# Patient Record
Sex: FEMALE | Race: WHITE
[De-identification: names, ages, dates, MRNs, and addresses within clinical notes are randomized per-mention and may not be internally consistent; named-entity substitution may affect disease eponyms.]

---

## 2017-01-11 ENCOUNTER — HOSPITAL ENCOUNTER (EMERGENCY)
Dept: HOSPITAL 25 - ED | Age: 22
Discharge: HOME | End: 2017-01-11
Payer: COMMERCIAL

## 2017-01-11 VITALS — SYSTOLIC BLOOD PRESSURE: 120 MMHG | DIASTOLIC BLOOD PRESSURE: 63 MMHG

## 2017-01-11 DIAGNOSIS — K08.89: Primary | ICD-10-CM

## 2017-01-11 PROCEDURE — 99282 EMERGENCY DEPT VISIT SF MDM: CPT

## 2017-01-11 NOTE — ED
Throat Pain/Nasal Congestion





- HPI Summary


HPI Summary: 


21 F w/ no PMH presents with dental pain today.  She had previous dental pain 

in that area when she bite into something today and felt her tooth crack.  She 

states that she also felt some pus leave the area.  She called her dentist who 

stated to come to the ER for antibiotics and he could see her next Thursday.  

She denies any SOB, fever, chest pain, and difficulty swallowing.





- History of Current Complaint


Chief Complaint: EDDentalPain


Time Seen by Provider: 01/11/17 22:17





- Allergies/Home Medications


Allergies/Adverse Reactions: 


 Allergies











Allergy/AdvReac Type Severity Reaction Status Date / Time


 


Bee Venom Allergy  Difficulty Verified 06/01/16 11:13





   Breathing  














PMH/Surg Hx/FS Hx/Imm Hx


Respiratory History: Reports: Hx Asthma


 History: Reports: Hx Kidney Infection


Psychiatric History: Reports: Hx Anxiety, Hx Depression


Infectious Disease History: No


Infectious Disease History: 


   Denies: Traveled Outside the [place] in Last 30 Days





- Family History


Known Family History: Positive: Hypertension





- Social History


Alcohol Use: None


Alcohol Amount: denies


Substance Use Type: Reports: None


Substance Use Comment - Amount & Last Used: denies


Smoking Status (MU): Current Every Day Smoker


Type: Cigarettes


Amount Used/How Often: 1/2ppd


Have You Smoked in the Last Year: Yes





Review of Systems


Negative: Fever


Positive: Dental Pain


Negative: Chest Pain


Negative: Shortness Of Breath


All Other Systems Reviewed And Are Negative: Yes





Physical Exam


Triage Information Reviewed: Yes


Vital Signs On Initial Exam: 


 Initial Vitals











Temp Pulse Resp BP Pulse Ox


 


 97.2 F   85   18   120/63   100 


 


 01/11/17 21:50  01/11/17 21:50  01/11/17 21:50  01/11/17 21:50  01/11/17 21:50











Vital Signs Reviewed: Yes


Appearance: Positive: Well-Appearing


Skin: Positive: Warm, Dry


Head/Face: Positive: Normal Head/Face Inspection


Eyes: Positive: Normal, EOMI, MEAGHAN, Conjunctiva Clear


ENT: Positive: Normal ENT inspection, Pharynx normal, TMs normal


Dental: Positive: Percussion Tenderness @ - 30, Gross Decay/Caries @ - 30.  

Negative: Dental Fracture @, Abscess @


Neck: Positive: Supple, Nontender, No Lymphadenopathy


Respiratory/Lung Sounds: Positive: Clear to Auscultation, Breath Sounds Present


Cardiovascular: Positive: Normal, RRR





Diagnostics





- Vital Signs


 Vital Signs











  Temp Pulse Resp BP Pulse Ox


 


 01/11/17 21:50  97.2 F  85  18  120/63  100














- Laboratory


Lab Statement: Any lab studies that have been ordered have been reviewed, and 

results considered in the medical decision making process.





EENT Course/Dx





- Course


Course Of Treatment: 21 F presents with dental pain, has taken many pain 

medication without relief, said that was drainage from potential cracked tooth 

early today s/p bite into something, on exam tenderness to 30 no fracture or 

abscess noted, will treat with antibiotics and pain medication, patient agrees 

with plan,





- Differential Diagnoses


Differential Diagnoses: Dental Abscess, Dental Caries, Fractured Tooth





- Diagnoses


Provider Diagnoses: 


 Pain, dental








Discharge





- Discharge Plan


Condition: Good


Disposition: HOME


Prescriptions: 


Penicillin VK TAB* [Penicillin Vk Tab*] 500 mg PO QID #27 tab


oxyCODONE/Acetamin 5/325 MG* [Percocet 5/325 TAB*] 1 tab PO Q6H PRN #12 tab MDD 

4


 PRN Reason: Pain


Patient Education Materials:  Toothache (ED), Penicillin V (By mouth), Oxycodone

/Acetaminophen (By mouth)


Referrals: 


[facility] [place] PHYSICIAN REFERRAL [Outside]


Additional Instructions: 


Take antibiotics: 4 times a day for 7 days, first dose given ED


Use ibuprofen every 6 hours and narcotic for break through pain


Avoid hard, crunchy food until seen by dentist


Follow up with dentist as scheduled


Return to ED if develop fever, shortness of breath, pain with eye movement or 

swelling around eye


Establish care with primary care physician

## 2017-01-17 ENCOUNTER — HOSPITAL ENCOUNTER (EMERGENCY)
Dept: HOSPITAL 25 - ED | Age: 22
LOS: 1 days | Discharge: HOME | End: 2017-01-18
Payer: COMMERCIAL

## 2017-01-17 VITALS — DIASTOLIC BLOOD PRESSURE: 90 MMHG | SYSTOLIC BLOOD PRESSURE: 117 MMHG

## 2017-01-17 DIAGNOSIS — K08.89: ICD-10-CM

## 2017-01-17 DIAGNOSIS — K02.9: Primary | ICD-10-CM

## 2017-01-17 DIAGNOSIS — K03.81: ICD-10-CM

## 2017-01-17 DIAGNOSIS — F17.210: ICD-10-CM

## 2017-01-17 PROCEDURE — 99282 EMERGENCY DEPT VISIT SF MDM: CPT

## 2017-01-18 NOTE — ED
Throat Pain/Nasal Congestion





- HPI Summary


HPI Summary: 


21 F presents with dental pain for 1 day.  She states she feel at work and 

broke her left upper tooth.  The tooth already had a cavity to it. She was 

already taking PCN for dental pain in another tooth pain since last week by me.

  She has a dental appointment scheduled for this Thursday.  She denies any SOB

, chest pain, swelling around the eyes, or difficulty swallowing.








- History of Current Complaint


Chief Complaint: EDDentalPain


Time Seen by Provider: 01/17/17 23:12





- Allergies/Home Medications


Allergies/Adverse Reactions: 


 Allergies











Allergy/AdvReac Type Severity Reaction Status Date / Time


 


Bee Venom Allergy  Difficulty Verified 06/01/16 11:13





   Breathing  














PMH/Surg Hx/FS Hx/Imm Hx


Endocrine/Hematology History: 


   Denies: Hx Anticoagulant Therapy


Respiratory History: Reports: Hx Asthma


 History: Reports: Hx Kidney Infection


Psychiatric History: Reports: Hx Anxiety, Hx Depression


Infectious Disease History: No


Infectious Disease History: 


   Denies: Traveled Outside the [place] in Last 30 Days





- Family History


Known Family History: Positive: Hypertension





- Social History


Alcohol Use: None


Alcohol Amount: denies


Substance Use Type: Reports: None


Substance Use Comment - Amount & Last Used: denies


Smoking Status (MU): Current Every Day Smoker


Type: Cigarettes


Amount Used/How Often: 1/2ppd


Have You Smoked in the Last Year: Yes





Review of Systems


Negative: Fever


Positive: Dental Pain


Negative: Chest Pain


Negative: Shortness Of Breath


All Other Systems Reviewed And Are Negative: Yes





Physical Exam


Triage Information Reviewed: Yes


Vital Signs On Initial Exam: 


 Initial Vitals











Temp Pulse Resp BP Pulse Ox


 


 97.7 F   94   18   117/90   100 


 


 01/17/17 22:51  01/17/17 22:51  01/17/17 22:51  01/17/17 22:51  01/17/17 22:51











Vital Signs Reviewed: Yes


Appearance: Positive: Well-Appearing


Skin: Positive: Warm, Dry


Head/Face: Positive: Normal Head/Face Inspection


Eyes: Positive: Normal, EOMI, MEAGHAN, Conjunctiva Clear


ENT: Positive: Normal ENT inspection, Pharynx normal, TMs normal


Dental: Positive: Gross Decay/Caries @ - 11, Dental Fracture @ - 11.  Negative: 

Abscess @, Bleeding


Neck: Positive: Supple, Nontender, No Lymphadenopathy


Respiratory/Lung Sounds: Positive: Clear to Auscultation, Breath Sounds Present


Cardiovascular: Positive: Normal, RRR





Procedures





- Procedure Summary


Procedure Summary: 


clean area, applied topical anaesthesia spray, applied dental paste to tooth 

number 11








Diagnostics





- Vital Signs


 Vital Signs











  Temp Pulse Resp BP Pulse Ox


 


 01/17/17 22:51  97.7 F  94  18  117/90  100














- Laboratory


Lab Statement: Any lab studies that have been ordered have been reviewed, and 

results considered in the medical decision making process.





EENT Course/Dx





- Course


Course Of Treatment: 21 F presents with dental pain s/p fracture tooth on 

counter, has been taking PCN for previous dental pain but states that upset 

stomach, took ibupforen for pain, has dental appt on this thursday, due to 

nerve being exposed placed dental paste on area after anasethsia, tolerated 

procedure, will prescribe amoxillicin for antibiotics and pain medication and 

have follow up with dentist, patient agrees with plan





- Differential Diagnoses


Differential Diagnoses: Dental Abscess, Dental Caries, Fractured Tooth





- Diagnoses


Provider Diagnoses: 


 Dental caries, Tooth fracture








Discharge





- Discharge Plan


Condition: Good


Disposition: HOME


Prescriptions: 


Amoxicillin CAP* 500 mg PO Q12H #13 cap


oxyCODONE/Acetamin 5/325 MG* [Percocet 5/325 TAB*] 1 tab PO Q6H PRN #8 tab MDD 4


 PRN Reason: Pain


Patient Education Materials:  Toothache (ED)


Referrals: 


[facility] [place] PHYSICIAN REFERRAL [Outside]


No Primary Care Phys,NOPCP [Primary Care Provider] - 


Additional Instructions: 


Take antibiotics: twice a day for 7 days, first dose given in ED


Use ibuprofen every 6 hours and narcotic for break through pain at night


Avoid hard, crunchy food until seen by dentist


Follow up with dentist as scheduled


Return to ED if develop fever, shortness of breath, pain with eye movement or 

swelling around eye


Establish care with primary care physician

## 2017-02-07 ENCOUNTER — HOSPITAL ENCOUNTER (EMERGENCY)
Dept: HOSPITAL 25 - ED | Age: 22
Discharge: HOME | End: 2017-02-07
Payer: COMMERCIAL

## 2017-02-07 VITALS — SYSTOLIC BLOOD PRESSURE: 148 MMHG | DIASTOLIC BLOOD PRESSURE: 74 MMHG

## 2017-02-07 DIAGNOSIS — F17.210: ICD-10-CM

## 2017-02-07 DIAGNOSIS — K08.89: Primary | ICD-10-CM

## 2017-02-07 PROCEDURE — 99282 EMERGENCY DEPT VISIT SF MDM: CPT
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
## 2017-04-24 ENCOUNTER — HOSPITAL ENCOUNTER (EMERGENCY)
Dept: HOSPITAL 25 - ED | Age: 22
Discharge: HOME | End: 2017-04-24
Payer: COMMERCIAL

## 2017-04-24 VITALS — DIASTOLIC BLOOD PRESSURE: 84 MMHG | SYSTOLIC BLOOD PRESSURE: 115 MMHG

## 2017-04-24 DIAGNOSIS — K08.89: Primary | ICD-10-CM

## 2017-04-24 DIAGNOSIS — F17.210: ICD-10-CM

## 2017-04-24 DIAGNOSIS — R06.02: ICD-10-CM

## 2017-04-24 PROCEDURE — 99281 EMR DPT VST MAYX REQ PHY/QHP: CPT

## 2017-04-24 NOTE — ED
Throat Pain/Nasal Congestion





- HPI Summary


HPI Summary: 


21F presents with dental pain for three days. pain is located on right lower 

tooth. Has dental appointment next Thursday.  Denies any fever, chest pain, SOB

, or difficulty swallowing. She took some percocet for previous dental 

infection which seemed to help. She has also been taking ibuprofen 








- History of Current Complaint


Chief Complaint: EDDentalPain


Time Seen by Provider: 04/24/17 13:00





- Allergies/Home Medications


Allergies/Adverse Reactions: 


 Allergies











Allergy/AdvReac Type Severity Reaction Status Date / Time


 


Bee Venom Allergy  Difficulty Verified 06/01/16 11:13





   Breathing  














PMH/Surg Hx/FS Hx/Imm Hx


Endocrine/Hematology History: 


   Denies: Hx Anticoagulant Therapy


Respiratory History: Reports: Hx Asthma


 History: Reports: Hx Kidney Infection


Psychiatric History: Reports: Hx Anxiety, Hx Depression


Infectious Disease History: No


Infectious Disease History: 


   Denies: Traveled Outside the [place] in Last 30 Days





- Family History


Known Family History: Positive: Hypertension





- Social History


Alcohol Use: None


Alcohol Amount: denies


Substance Use Type: Reports: None


Substance Use Comment - Amount & Last Used: denies


Smoking Status (MU): Current Every Day Smoker


Type: Cigarettes


Amount Used/How Often: 1/2ppd


Have You Smoked in the Last Year: Yes





Review of Systems


Negative: Fever


Positive: Dental Pain


Negative: Chest Pain


Positive: Shortness Of Breath


All Other Systems Reviewed And Are Negative: Yes





Physical Exam


Triage Information Reviewed: Yes


Vital Signs On Initial Exam: 


 Initial Vitals











Temp Pulse Resp BP Pulse Ox


 


 97.3 F   93   18   115/64   90 


 


 04/24/17 12:27  04/24/17 12:27  04/24/17 12:27  04/24/17 12:27  04/24/17 12:27











Vital Signs Reviewed: Yes


Appearance: Positive: Well-Appearing


Skin: Positive: Warm, Dry


Head/Face: Positive: Normal Head/Face Inspection


Eyes: Positive: Normal, Conjunctiva Clear


ENT: Positive: Normal ENT inspection, Pharynx normal, TMs normal


Dental: Positive: Percussion Tenderness @ - 30, Gross Decay/Caries @ - 30.  

Negative: Abscess @, Bleeding


Respiratory/Lung Sounds: Positive: Clear to Auscultation, Breath Sounds Present


Cardiovascular: Positive: Normal, RRR





Diagnostics





- Vital Signs


 Vital Signs











  Temp Pulse Resp BP Pulse Ox


 


 04/24/17 12:39  97.3 F  93  18  115/84  97


 


 04/24/17 12:27  97.3 F  93  18  115/64  90














- Laboratory


Lab Statement: Any lab studies that have been ordered have been reviewed, and 

results considered in the medical decision making process.





EENT Course/Dx





- Course


Course Of Treatment: 21F presents with dental pain for 3 days. pain located at 

tooth 30. denies any fever, chest pain or SOB. will treat with amoxicllin and 

pain medication. patient understands and agrees with plan





- Differential Diagnoses


Differential Diagnoses: Dental Abscess, Dental Caries, Fractured Tooth





- Diagnoses


Provider Diagnoses: 


 Pain, dental








Discharge





- Discharge Plan


Condition: Good


Disposition: HOME


Prescriptions: 


Amoxicillin CAP* [Amoxicillin 500 MG CAP*] 500 mg PO Q12H #13 cap


oxyCODONE/Acetamin 5/325 MG* [Percocet 5/325 TAB*] 1 tab PO Q6H PRN #6 tab MDD 4


 PRN Reason: Pain


Patient Education Materials:  Toothache (ED)


Referrals: 


[facility] [place] PHYSICIAN REFERRAL [Outside]


Additional Instructions: 


Take antibiotics twice a day, first dose given in ED


Use ibuprofen every 6 hours and narcotic for break through pain at night


Avoid hard, crunchy food until seen by dentist


Follow up with dentist as soon as possible


Return to ED if develop fever, shortness of breath, pain with eye movement or 

swelling around eye


Establish care with primary care physician 





Images





- Images


Dental: 


  __________________________














  __________________________





 1 - pain

## 2017-05-06 ENCOUNTER — HOSPITAL ENCOUNTER (EMERGENCY)
Dept: HOSPITAL 25 - ED | Age: 22
Discharge: LEFT BEFORE DISCHARGE | End: 2017-05-06
Payer: COMMERCIAL

## 2017-05-06 VITALS — SYSTOLIC BLOOD PRESSURE: 120 MMHG | DIASTOLIC BLOOD PRESSURE: 73 MMHG

## 2017-05-06 DIAGNOSIS — R51: Primary | ICD-10-CM

## 2017-05-06 DIAGNOSIS — Z53.20: ICD-10-CM

## 2018-01-31 ENCOUNTER — HOSPITAL ENCOUNTER (EMERGENCY)
Dept: HOSPITAL 25 - ED | Age: 23
Discharge: HOME | End: 2018-01-31
Payer: COMMERCIAL

## 2018-01-31 VITALS — SYSTOLIC BLOOD PRESSURE: 127 MMHG | DIASTOLIC BLOOD PRESSURE: 84 MMHG

## 2018-01-31 DIAGNOSIS — K04.7: Primary | ICD-10-CM

## 2018-01-31 DIAGNOSIS — F17.210: ICD-10-CM

## 2018-01-31 DIAGNOSIS — K08.89: ICD-10-CM

## 2018-01-31 PROCEDURE — 99282 EMERGENCY DEPT VISIT SF MDM: CPT

## 2018-01-31 NOTE — ED
Throat Pain/Nasal Congestion





- HPI Summary


HPI Summary: 





22 female presents to ED with complaints of dental pain that has been ongoing 

for the past few days. Patient states she was diagnosed with dental abscess and 

wisdom tooth impaction 4 days ago, began on bactrim for infection and has been 

taking ibuprofen without relief. Pain has become unbearable. Patient states the 

bactrim gave her an upset stomach so she stopped taking it. Is scheduled to go 

to oral surgeon next week to pull teeth. Patient has taken tramadol, tylenol 

and ibuprofen without relief. Also has been using topical anesthetic. No 

drainage, difficulty swallowing, difficult breathing or fever/chills. States an 

abscess may have popped. Denies sore throat. Chewing makes pain worse. No other 

complaints. No PMHx other than depression.





- History of Current Complaint


Chief Complaint: EDDentalPain


Time Seen by Provider: 01/31/18 17:31


Hx Obtained From: Patient, Family/Caretaker - mother


Onset/Duration: Gradual Onset, Lasting Days, Still Present, Worse Since


Severity: Severe


Associated Signs And Symptoms: Positive: Negative


Cough: None





- Allergies/Home Medications


Allergies/Adverse Reactions: 


 Allergies











Allergy/AdvReac Type Severity Reaction Status Date / Time


 


Latex, Natural Rubber Allergy  Hives Verified 01/31/18 17:11


 


MS Bee Venom [Bee Venom] Allergy  Difficulty Verified 06/01/16 11:13





   Breathing  














PMH/Surg Hx/FS Hx/Imm Hx


Endocrine/Hematology History: 


   Denies: Hx Anticoagulant Therapy


Respiratory History: Reports: Hx Asthma


 History: Reports: Hx Kidney Infection


Psychiatric History: Reports: Hx Anxiety, Hx Depression





- Surgical History


Surgery Procedure, Year, and Place: n/a





- Immunization History


Immunizations Up to Date: Yes


Infectious Disease History: No


Infectious Disease History: 


   Denies: Traveled Outside the [place] in Last 30 Days





- Family History


Known Family History: Positive: Hypertension





- Social History


Alcohol Use: None


Alcohol Amount: denies


Substance Use Type: Reports: None


Substance Use Comment - Amount & Last Used: denies


Smoking Status (MU): Current Every Day Smoker


Type: Cigarettes


Amount Used/How Often: 1/2ppd


Have You Smoked in the Last Year: Yes





Review of Systems


Constitutional: Negative


Positive: Dental Pain


Cardiovascular: Negative


Respiratory: Negative


All Other Systems Reviewed And Are Negative: Yes





Physical Exam


Triage Information Reviewed: Yes


Vital Signs On Initial Exam: 


 Initial Vitals











Temp Pulse Resp BP Pulse Ox


 


 97.5 F   111   16   131/90   97 


 


 01/31/18 17:07  01/31/18 17:07  01/31/18 17:07  01/31/18 17:07  01/31/18 17:07








tachycardia noted, patient writhing in pain on exam table 


Vital Signs Reviewed: Yes


Appearance: Positive: Well-Appearing, No Pain Distress, Well-Nourished


Skin: Positive: Warm, Skin Color Reflects Adequate Perfusion, Dry


Head/Face: Positive: Normal Head/Face Inspection, Other - no edema or abscess 

palapted


Eyes: Positive: Conjunctiva Clear


Dental: Positive: Gross Decay/Caries @, Dental Fracture @ - #4, Other - 

impacted wisdom tooth bottom right noted. no drainage.  Negative: Abscess @, 

Cervical Lymphadenopathy


Neck: Positive: Supple, Nontender, No Lymphadenopathy


Respiratory/Lung Sounds: Positive: Clear to Auscultation, Breath Sounds 

Present.  Negative: Rales, Rhonchi, Wheezes


Cardiovascular: Positive: Normal, RRR, Pulses are Symmetrical in both Upper and 

Lower Extremities.  Negative: Murmur, Rub


Neurological: Positive: Normal





Diagnostics





- Vital Signs


 Vital Signs











  Temp Pulse Resp BP Pulse Ox


 


 01/31/18 17:45    20  


 


 01/31/18 17:07  97.5 F  111  16  131/90  97














- Laboratory


Lab Statement: Any lab studies that have been ordered have been reviewed, and 

results considered in the medical decision making process.





EENT Course/Dx





- Course


Course Of Treatment: given pain management while in ED. Had last advil 2-3 

hours ago, states she took ~12 throughout last night and today. educated on 

abscess and wisdom tooth impaction. has scheduled appt for nex week with oral 

surgeon, encouraged getting in sooner. salt water gargles, topical oragel and 

pain medication along with appropriate dosing of ibuprofen, also educated on at 

home. also switched bactrim to clindamycin due to patient stating it gave her 

an upset stomach and she stopped taking it. good oral hygeine. soft foods and 

liquid diet. no other concerns at this time. normal oropharynx otherwise. 

follow up denitst. aware of worsening signs and symptoms to watch out for.





- Differential Diagnoses


Differential Diagnoses: Dental Abscess, Dental Caries, Other - wisdome tooth 

impaction





- Diagnoses


Provider Diagnoses: 


 Toothache, Dental infection








Discharge





- Discharge Plan


Condition: Stable


Disposition: HOME


Prescriptions: 


Clindamycin Cap(NF) [Clindamycin Cap 300 mg Cap(NF)] 300 mg PO TID #30 cap


oxyCODONE/Acetamin 5/325 MG* [Percocet 5/325 TAB*] 1 tab PO Q6H PRN #14 tab MDD 

2


 PRN Reason: Pain


Patient Education Materials:  Dental Abscess (ED), Toothache (ED)


[place]: 


[facility] [place]


Maikol Clark MD [Doctor of Dental Medicine] - 


Bobby Patton MD [Doctor of Dental Medicine] - 


Additional Instructions: 


Please call tomorrow to make an appointment to have teeth pulled as instructed.


Stop bactrim and take clindamycin in replace. Recommend daily probiotic or 

eating greek yogurt to replenish normal eyal while taking antibiotics.


Take ibuprofen with food every 6-8 hours ONLY 600MG. 


Take pain medication only as needed for break through pain.


Salt water swishes.


Try ice instead of heat.


Liquid, soft foods diet.


Good oral hygiene.


Apply orajel multiple times daily to help with pain.


Follow up with oral surgeon/dentist.


Any new or worsening symptoms please seek medical attention promptly.

## 2018-06-11 ENCOUNTER — HOSPITAL ENCOUNTER (EMERGENCY)
Dept: HOSPITAL 25 - ED | Age: 23
Discharge: HOME | End: 2018-06-11
Payer: COMMERCIAL

## 2018-06-11 VITALS — SYSTOLIC BLOOD PRESSURE: 130 MMHG | DIASTOLIC BLOOD PRESSURE: 78 MMHG

## 2018-06-11 DIAGNOSIS — K02.9: Primary | ICD-10-CM

## 2018-06-11 DIAGNOSIS — F17.210: ICD-10-CM

## 2018-06-11 PROCEDURE — 99282 EMERGENCY DEPT VISIT SF MDM: CPT

## 2018-06-11 NOTE — ED
Throat Pain/Nasal Congestion





- HPI Summary


HPI Summary: 


22-year-old female presents with dental pain for the past couple days.  She 

fractured her tooth 21 and is having extreme pain.  She states she is took some 

tramadol states that she had home.  she also tried Tylenol ibuprofen with 

minimal relief.  She states that temperature and water make it worse.  She has 

a dentist appointment on Thursday.  She is requesting a Novocaine shot.  No 

pain with eye movement.  No sore throat.  no difficulty swallowing.  No chest 

pain shortness breath.








- History of Current Complaint


Chief Complaint: EDDentalPain


Time Seen by Provider: 06/11/18 19:25





- Allergies/Home Medications


Allergies/Adverse Reactions: 


 Allergies











Allergy/AdvReac Type Severity Reaction Status Date / Time


 


Latex, Natural Rubber Allergy  Hives Verified 01/31/18 17:11


 


MS Bee Venom [Bee Venom] Allergy  Difficulty Verified 06/01/16 11:13





   Breathing  














PMH/Surg Hx/FS Hx/Imm Hx


Endocrine/Hematology History: 


   Denies: Hx Anticoagulant Therapy


Respiratory History: Reports: Hx Asthma


 History: Reports: Hx Kidney Infection


Psychiatric History: Reports: Hx Anxiety, Hx Depression





- Surgical History


Surgery Procedure, Year, and Place: n/a


Infectious Disease History: No


Infectious Disease History: 


   Denies: Traveled Outside the [place] in Last 30 Days





- Family History


Known Family History: Positive: Hypertension





- Social History


Alcohol Use: None


Alcohol Amount: denies


Substance Use Type: Reports: None


Substance Use Comment - Amount & Last Used: denies


Smoking Status (MU): Current Every Day Smoker


Type: Cigarettes


Amount Used/How Often: 1/2ppd


Have You Smoked in the Last Year: Yes





Review of Systems


Negative: Fever


Positive: Dental Pain


Negative: Chest Pain


Negative: Shortness Of Breath


All Other Systems Reviewed And Are Negative: Yes





Physical Exam


Triage Information Reviewed: Yes


Vital Signs On Initial Exam: 


 Initial Vitals











Temp Pulse Resp BP Pulse Ox


 


 97.9 F   89   19   134/88   99 


 


 06/11/18 18:49  06/11/18 18:49  06/11/18 18:49  06/11/18 18:49  06/11/18 18:49











Vital Signs Reviewed: Yes


Appearance: Positive: Well-Appearing


Skin: Positive: Warm, Dry


Head/Face: Positive: Normal Head/Face Inspection


Eyes: Positive: Normal, EOMI, MEAGHAN, Conjunctiva Clear


ENT: Positive: Normal ENT inspection, Pharynx normal, TMs normal


Dental: Positive: Dental Fracture @ - 21


Neck: Positive: Supple, Nontender, No Lymphadenopathy


Respiratory/Lung Sounds: Positive: Clear to Auscultation, Breath Sounds Present


Cardiovascular: Positive: Normal, RRR


Musculoskeletal: Positive: Normal


Neurological: Positive: Normal


Psychiatric: Positive: Normal





Diagnostics





- Vital Signs


 Vital Signs











  Temp Pulse Resp BP Pulse Ox


 


 06/11/18 20:16  98 F  86  18  130/78  98


 


 06/11/18 18:49  97.9 F  89  19  134/88  99














- Laboratory


Lab Statement: Any lab studies that have been ordered have been reviewed, and 

results considered in the medical decision making process.





EENT Course/Dx





- Course


Course Of Treatment: 22-year-old female presents with dental pain for the past 

couple days.  She fractured her tooth 21 and is having extreme pain.  She 

states she is took some tramadol states that she had home.  she also tried 

Tylenol ibuprofen with minimal relief.  She states that temperature and water 

make it worse.  She has a dentist appointment on Thursday.  She is requesting a 

Novocaine shot.  No pain with eye movement.  No sore throat.  no difficulty 

swallowing.  No chest pain shortness breath.  On exam has fractured tooth at 

tooth 21.  No evidence of abscess.  Discussed placing a temporary filling and 

patient declined.  Gave Novocaine shot.   gave pain medication and penicillin.  

Told to follow with dentist.  Patient understands agrees plan.





- Differential Diagnoses


Differential Diagnoses: Dental Abscess, Dental Caries, Fractured Tooth





- Diagnoses


Provider Diagnoses: 


 Dental caries








Discharge





- Sign-Out/Discharge


Documenting (check all that apply): Discharge/Admit/Transfer





- Discharge Plan


Condition: Good


Disposition: HOME


Prescriptions: 


oxyCODONE/Acetamin 5/325 MG* [Percocet 5/325 TAB*] 1 tab PO Q6H PRN #8 tab MDD 4


 PRN Reason: Pain


Penicillin VK TAB* [Penicillin  mg Tab*] 500 mg PO QID #27 tab


Patient Education Materials:  Toothache (ED)


Forms:  *Work Release


Referrals: 


[facility] [place] PHYSICIAN REFERRAL [Outside]


Additional Instructions: 


Take antibiotics: 4 times a day for 7 days, first dose given in ED


Use ibuprofen every 6 hours and narcotic for break through pain at night


Avoid hard, crunchy food until seen by dentist


Follow up with dentist as soon as possible


Return to ED if develop fever, shortness of breath, pain with eye movement or 

swelling around eye





- Billing Disposition and Condition


Condition: GOOD


Disposition: Home





Images





- Images


Dental: 


  __________________________














  __________________________





 1 - dental fracture

## 2018-06-15 ENCOUNTER — HOSPITAL ENCOUNTER (EMERGENCY)
Dept: HOSPITAL 25 - ED | Age: 23
Discharge: HOME | End: 2018-06-15
Payer: COMMERCIAL

## 2018-06-15 VITALS — DIASTOLIC BLOOD PRESSURE: 76 MMHG | SYSTOLIC BLOOD PRESSURE: 111 MMHG

## 2018-06-15 DIAGNOSIS — Z88.8: ICD-10-CM

## 2018-06-15 DIAGNOSIS — Z82.49: ICD-10-CM

## 2018-06-15 DIAGNOSIS — R11.0: ICD-10-CM

## 2018-06-15 DIAGNOSIS — F17.210: ICD-10-CM

## 2018-06-15 DIAGNOSIS — R10.9: Primary | ICD-10-CM

## 2018-06-15 DIAGNOSIS — Z88.0: ICD-10-CM

## 2018-06-15 DIAGNOSIS — J45.909: ICD-10-CM

## 2018-06-15 LAB
BASOPHILS # BLD AUTO: 0 10^3/UL (ref 0–0.2)
EOSINOPHIL # BLD AUTO: 0.2 10^3/UL (ref 0–0.6)
HCT VFR BLD AUTO: 44 % (ref 35–47)
HGB BLD-MCNC: 15 G/DL (ref 12–16)
LYMPHOCYTES # BLD AUTO: 2.1 10^3/UL (ref 1–4.8)
MCH RBC QN AUTO: 32 PG (ref 27–31)
MCHC RBC AUTO-ENTMCNC: 34 G/DL (ref 31–36)
MCV RBC AUTO: 92 FL (ref 80–97)
MONOCYTES # BLD AUTO: 1 10^3/UL (ref 0–0.8)
NEUTROPHILS # BLD AUTO: 11.5 10^3/UL (ref 1.5–7.7)
NRBC # BLD AUTO: 0 10^3/UL
NRBC BLD QL AUTO: 0
PLATELET # BLD AUTO: 264 10^3/UL (ref 150–450)
RBC # BLD AUTO: 4.74 10^6/UL (ref 4–5.4)
WBC # BLD AUTO: 14.8 10^3/UL (ref 3.5–10.8)

## 2018-06-15 PROCEDURE — 83690 ASSAY OF LIPASE: CPT

## 2018-06-15 PROCEDURE — 84702 CHORIONIC GONADOTROPIN TEST: CPT

## 2018-06-15 PROCEDURE — 80053 COMPREHEN METABOLIC PANEL: CPT

## 2018-06-15 PROCEDURE — 36415 COLL VENOUS BLD VENIPUNCTURE: CPT

## 2018-06-15 PROCEDURE — 85025 COMPLETE CBC W/AUTO DIFF WBC: CPT

## 2018-06-15 PROCEDURE — 74176 CT ABD & PELVIS W/O CONTRAST: CPT

## 2018-06-15 PROCEDURE — 99283 EMERGENCY DEPT VISIT LOW MDM: CPT

## 2018-06-15 NOTE — RAD
CLINICAL HISTORY: right flank pain ,



COMPARISON: August 18, 2013



TECHNIQUE: Multiple contiguous axial CT scans were obtained of the abdomen and pelvis,

without intravenous contrast enhancement. Coronal and sagittal multiplanar reformations

are submitted for review.  Oral contrast was not administered.



FINDINGS: 

The study is limited by the lack of intravenous contrast. This limits evaluation of the

solid organs and vasculature.





LUNG BASES: The lung bases are clear.



LIVER: The liver is normal in shape, size, contour, and attenuation.

BILE DUCTS: There is no intrahepatic or extrahepatic biliary dilatation.

GALLBLADDER: The gallbladder is normal, without pericholecystic inflammatory change.



PANCREAS: The pancreas is normal, without mass or ductal dilatation.

SPLEEN: Normal in size and appearance.



UPPER GI TRACT: Evaluation of the gastrointestinal tract is limited by incomplete gastric

distention. The upper GI tract is unremarkable.

SMALL BOWEL AND MESENTERY: The small bowel is normal in contour, course, and caliber.

There is no obstruction or dilatation.

COLON: The colon is normal in contour, course, caliber. There is no pericolonic

inflammatory change.



ADRENALS: Normal bilaterally.

KIDNEYS: The kidneys are normal in shape, size, contour, and axis. There is no

hydronephrosis or nephrolithiasis.

BLADDER: The bladder is smooth in contour.



PELVIC ORGANS: The left ovary is mildly prominent. The uterus and adnexa are otherwise

grossly normal for technique.



AORTA: The aorta is normal.

IVC: Unremarkable



LYMPH NODES: There is no lymphadenopathy by size criteria.



ABDOMINAL WALL: There is no evidence for abdominal wall hernia.

BONES AND SOFT TISSUES: Unremarkable

OTHER: None



IMPRESSION:

MILDLY PROMINENT LEFT OVARY, WHICH MAY BE PHYSIOLOGIC. OTHERWISE, UNREMARKABLE NONCONTRAST

CT OF THE ABDOMEN AND PELVIS.

## 2018-06-15 NOTE — ED
Kesha ANGELO Elizabeth, scribed for Eric Reyna MD on 06/15/18 at 0509 .





Back Pain





- HPI Summary


HPI Summary: 


This patient is a 22 year old F BIBA to [facility] with a chief complaint of 

constant left flank pain since 16:00 yesterday. The patient reports that the 

pain radiates to the left side of her abdomen. Patient denies any injury. The 

patient rates the pain 8/10 in severity. Symptoms aggravated by nothing. 

Symptoms alleviated by nothing. Patient reports nausea. Patient denies dysuria 

and hematuria. Patient has hx of kidney stones but notes that her current pain 

is far worse than the pain when she last had a kidney stone. Patient notes she 

took Vicodin at 16:00 yesterday for pain management with minimal relief..








- History of Current Complaint


Chief Complaint: EDFlankPain


Stated Complaint: BACK PAIN/ABD PAIN


Time Seen by Provider: 06/15/18 04:47


Hx Obtained From: Patient


Onset/Duration: Sudden Onset, Lasting Hours, Still Present


Onset/Duration: Started Hours Ago, Atraumatic, Still Present


Timing: Constant


Back Pain Location: Radiates To - L abdomen


Severity Initially: Moderate


Severity Currently: Moderate


Pain Intensity: 8


Pain Scale Used: 0-10 Numeric


Aggravating Symptom(s): Nothing


Alleviating Symptom(s): Nothing


Associated Signs And Symptoms: Positive: Abdominal Pain - left side, Flank Pain 

- left side





- Allergies/Home Medications


Allergies/Adverse Reactions: 


 Allergies











Allergy/AdvReac Type Severity Reaction Status Date / Time


 


Latex, Natural Rubber Allergy  Hives Verified 01/31/18 17:11


 


MS Bee Venom [Bee Venom] Allergy  Difficulty Verified 06/01/16 11:13





   Breathing  


 


Penicillins Allergy  Hives Verified 06/15/18 04:48














PMH/Surg Hx/FS Hx/Imm Hx


Endocrine/Hematology History: 


   Denies: Hx Anticoagulant Therapy


Respiratory History: Reports: Hx Asthma


 History: Reports: Hx Kidney Infection, Hx Kidney Stones


Psychiatric History: Reports: Hx Anxiety, Hx Depression





- Surgical History


Surgery Procedure, Year, and Place: n/a





- Immunization History


Date of Tetanus Vaccine: utd


Date of Influenza Vaccine: none


Infectious Disease History: No


Infectious Disease History: 


   Denies: Traveled Outside the [place] in Last 30 Days





- Family History


Known Family History: Positive: Hypertension





- Social History


Alcohol Use: None


Alcohol Amount: denies


Substance Use Type: Reports: None


Substance Use Comment - Amount & Last Used: denies


Smoking Status (MU): Heavy Every Day Tobacco Smoker


Type: Cigarettes


Amount Used/How Often: 1/2ppd


Have You Smoked in the Last Year: Yes





Review of Systems


Negative: Fever


Negative: Epistaxis


Positive: Abdominal Pain - left side, Nausea


Positive: flank pain - left side.  Negative: dysuria, hematuria


All Other Systems Reviewed And Are Negative: Yes





Physical Exam





- Summary


Physical Exam Summary: 


Appearance: Well-appearing, no distress, Well-nourished; uncooperative


Skin: Warm, color reflects adequate perfusion


Head: Normal Head/Face inspection


Eyes: EOMI, PERRLA


ENT: Normal inspection


Neck: Supple, no nodes, no JVD.


Respiratory: Lungs clear, Normal breath sounds, no respiratory distress


Cardio: RRR, No murmur, pulses normal, brisk capillary refill


Abdomen: soft, nontender, no guarding, no rebound


Bowel sounds: present 


Musculoskeletal: Strength Intact/ ROM intact. No calf tenderness. No edema. 

Left flank tenderness, no swelling, no erythema, no midline spinal tenderness


Neuro: Alert, muscle tone normal, facial symmetry, speech normal, sensory/motor 

intact 


Psychological: Normal





Triage Information Reviewed: Yes


Vital Signs On Initial Exam: 


 Initial Vitals











Temp Pulse Resp BP Pulse Ox


 


 98.2 F   97   18   127/71   98 


 


 06/15/18 04:47  06/15/18 04:47  06/15/18 04:47  06/15/18 04:47  06/15/18 04:47











Vital Signs Reviewed: Yes





Diagnostics





- Vital Signs


 Vital Signs











  Temp Pulse Resp BP Pulse Ox


 


 06/15/18 04:50  98.2 F  95  18  127/71  98


 


 06/15/18 04:47  98.2 F  97  18  127/71  98














- Laboratory


Lab Results: 


 Lab Results











  06/15/18 06/15/18 Range/Units





  05:17 05:17 


 


WBC  14.8 H   (3.5-10.8)  10^3/ul


 


RBC  4.74   (4.00-5.40)  10^6/ul


 


Hgb  15.0   (12.0-16.0)  g/dl


 


Hct  44   (35-47)  %


 


MCV  92   (80-97)  fL


 


MCH  32 H   (27-31)  pg


 


MCHC  34   (31-36)  g/dl


 


RDW  13   (10.5-15)  %


 


Plt Count  264   (150-450)  10^3/ul


 


MPV  7.7   (7.4-10.4)  um3


 


Neut % (Auto)  77.6   (38-83)  %


 


Lymph % (Auto)  13.9 L   (25-47)  %


 


Mono % (Auto)  6.9   (0-7)  %


 


Eos % (Auto)  1.3   (0-6)  %


 


Baso % (Auto)  0.3   (0-2)  %


 


Absolute Neuts (auto)  11.5 H   (1.5-7.7)  10^3/ul


 


Absolute Lymphs (auto)  2.1   (1.0-4.8)  10^3/ul


 


Absolute Monos (auto)  1.0 H   (0-0.8)  10^3/ul


 


Absolute Eos (auto)  0.2   (0-0.6)  10^3/ul


 


Absolute Basos (auto)  0   (0-0.2)  10^3/ul


 


Absolute Nucleated RBC  0   10^3/ul


 


Nucleated RBC %  0   


 


Sodium   137  (135-145)  mmol/L


 


Potassium   4.1  (3.5-5.0)  mmol/L


 


Chloride   104  (101-111)  mmol/L


 


Carbon Dioxide   28  (22-32)  mmol/L


 


Anion Gap   5  (2-11)  mmol/L


 


BUN   17  (6-24)  mg/dL


 


Creatinine   0.70  (0.51-0.95)  mg/dL


 


Est GFR ( Amer)   134.6  (>60)  


 


Est GFR (Non-Af Amer)   104.6  (>60)  


 


BUN/Creatinine Ratio   24.3 H  (8-20)  


 


Glucose   93  ()  mg/dL


 


Calcium   9.3  (8.6-10.3)  mg/dL


 


Total Bilirubin   0.30  (0.2-1.0)  mg/dL


 


AST   15  (13-39)  U/L


 


ALT   11  (7-52)  U/L


 


Alkaline Phosphatase   57  ()  U/L


 


Total Protein   6.5  (6.4-8.9)  g/dL


 


Albumin   4.1  (3.2-5.2)  g/dL


 


Globulin   2.4  (2-4)  g/dL


 


Albumin/Globulin Ratio   1.7  (1-3)  


 


Lipase   10 L  (11.0-82.0)  U/L


 


Beta HCG, Quant   < 0.60  mIU/mL











Result Diagrams: 


 06/15/18 05:17





 06/15/18 05:17


Lab Statement: Any lab studies that have been ordered have been reviewed, and 

results considered in the medical decision making process.





- CT


  ** No standard instances


CT Interpretation: No Acute Changes





Re-Evaluation





- Re-Evaluation


  ** First Eval


Re-Evaluation Time: 06:38


Change: Unchanged - Pt continues to refuse analgesia at this time. Pt pain 

unchanged. Awaiting Ct scan results. POt continues to refuse to provide UA as 

well.





  ** Second Eval


Re-Evaluation Time: 07:03


Change: Improved - Pt resting comfortably in bed. Pt now agrees to oral 

analgesia. Plan for symptomatic treatment with close f/u.





Back Pain Course/Dx





- Course


Course Of Treatment: Pt with negative lab and radiology w/u. Unclear etiology 

of pt's acute pain. Plan for symptomatic tx with close f/u.





- Diagnoses


Differential Diagnosis/HQI/PQRI: Positive: Epidural Abscess, Fracture, 

Herniated Disc, Neoplasm, Renal Colic, Strain, Sprain, Other - UTI


Provider Diagnoses: 


 Flank pain








Discharge





- Sign-Out/Discharge


Documenting (check all that apply): Discharge/Admit/Transfer





- Discharge Plan


Condition: Improved


Disposition: HOME


Prescriptions: 


Ketorolac TAB * [Toradol TAB *] 10 mg PO Q6H PRN 3 Days #10 tab


 PRN Reason: Pain


Methocarbamol TAB* [Robaxin 500 MG TAB*] 500 mg PO TID PRN 3 Days #10 tab


 PRN Reason: Pain


predniSONE TAB* [Deltasone 20 MG TAB*] 40 mg PO DAILY 3 Days #6 tab


Patient Education Materials:  Flank Pain (ED)


Referrals: 


No Primary Care Phys,NOPCP [Primary Care Provider] - 2 Days


Joel Kim MD [Medical Doctor] - As Soon As Possible





- Billing Disposition and Condition


Condition: IMPROVED


Disposition: Home





The documentation as recorded by the Kesha bob Elizabeth accurately 

reflects the service I personally performed and the decisions made by Axel anderson Omari A, MD.

## 2019-06-07 ENCOUNTER — HOSPITAL ENCOUNTER (EMERGENCY)
Dept: HOSPITAL 25 - ED | Age: 24
Discharge: HOME | End: 2019-06-07
Payer: COMMERCIAL

## 2019-06-07 VITALS — DIASTOLIC BLOOD PRESSURE: 71 MMHG | SYSTOLIC BLOOD PRESSURE: 140 MMHG

## 2019-06-07 DIAGNOSIS — Z91.030: ICD-10-CM

## 2019-06-07 DIAGNOSIS — F17.210: ICD-10-CM

## 2019-06-07 DIAGNOSIS — K04.7: Primary | ICD-10-CM

## 2019-06-07 DIAGNOSIS — Z91.040: ICD-10-CM

## 2019-06-07 DIAGNOSIS — Z88.0: ICD-10-CM

## 2019-06-07 PROCEDURE — 99282 EMERGENCY DEPT VISIT SF MDM: CPT

## 2019-06-07 NOTE — ED
Throat Pain/Nasal Congestion





- HPI Summary


HPI Summary: 


23-year-old female presents with dental pain for the past 2 days.  States she's 

had increasing swelling on the left side of her upper jaw.  She states she has 

history of dental abscess.  She has follow-up with the dentist next week.  

States the pain is constant and unbearable.  She has been taking ibuprofen 

without relief.  She denies any fevers.  No pain or swelling around her eyes.  

No pain with eye movement.  No chest pain or shortness of breath.  On exam has 

abscess noted to left upper jaw.  Patient declined I&D.  Will place on 

clindamycin.  Gave short course of pain medication.  Patient understands agrees 

with plan.








- History of Current Complaint


Chief Complaint: EDDentalPain


Time Seen by Provider: 06/07/19 00:40





- Allergies/Home Medications


Allergies/Adverse Reactions: 


 Allergies











Allergy/AdvReac Type Severity Reaction Status Date / Time


 


bee venom protein (honey bee) Allergy  Difficulty Verified 06/07/19 00:37





   Breathing/Wheezing  


 


Latex, Natural Rubber Allergy  Hives Verified 06/07/19 00:36


 


Penicillins Allergy  Hives Verified 06/07/19 00:36














PMH/Surg Hx/FS Hx/Imm Hx


Endocrine/Hematology History: 


   Denies: Hx Anticoagulant Therapy


Respiratory History: Reports: Hx Asthma


 History: Reports: Hx Kidney Infection, Hx Kidney Stones


Psychiatric History: Reports: Hx Anxiety, Hx Depression





- Surgical History


Surgery Procedure, Year, and Place: n/a





- Immunization History


Date of Tetanus Vaccine: utd


Date of Influenza Vaccine: none


Infectious Disease History: No


Infectious Disease History: 


   Denies: Traveled Outside the [place] in Last 30 Days





- Family History


Known Family History: Positive: Hypertension





- Social History


Alcohol Use: None


Alcohol Amount: denies


Substance Use Type: Reports: None


Substance Use Comment - Amount & Last Used: denies


Smoking Status (MU): Heavy Every Day Tobacco Smoker


Type: Cigarettes


Amount Used/How Often: 1/2ppd


Have You Smoked in the Last Year: Yes





Review of Systems


Negative: Fever


Positive: Dental Pain


Negative: Chest Pain


Negative: Shortness Of Breath


All Other Systems Reviewed And Are Negative: Yes





Physical Exam


Triage Information Reviewed: Yes


Vital Signs On Initial Exam: 


 Initial Vitals











Temp Pulse Resp BP Pulse Ox


 


 98.7 F   116   16   148/90   99 


 


 06/07/19 00:30  06/07/19 00:30  06/07/19 00:30  06/07/19 00:30  06/07/19 00:30











Vital Signs Reviewed: Yes


Appearance: Positive: Well-Appearing


Skin: Positive: Warm, Dry


Head/Face: Positive: Normal Head/Face Inspection


Eyes: Positive: Normal, EOMI, Conjunctiva Clear


ENT: Positive: Normal ENT inspection, Pharynx normal, TMs normal


Dental: Positive: Abscess @ - left upper jaw.  Negative: Gross Decay/Caries @


Neck: Positive: Supple, Nontender, No Lymphadenopathy


Respiratory/Lung Sounds: Positive: Clear to Auscultation, Breath Sounds Present


Cardiovascular: Positive: Normal, RRR


Musculoskeletal: Positive: Normal


Neurological: Positive: Normal


Psychiatric: Positive: Normal





Diagnostics





- Vital Signs


 Vital Signs











  Temp Pulse Resp BP Pulse Ox


 


 06/07/19 01:21  98.7 F  99  16  140/71  99


 


 06/07/19 00:30  98.7 F  116  16  148/90  99














- Laboratory


Lab Statement: Any lab studies that have been ordered have been reviewed, and 

results considered in the medical decision making process.





EENT Course/Dx





- Course


Course Of Treatment: 23-year-old female presents with dental pain for the past 

2 days.  States she's had increasing swelling on the left side of her upper 

jaw.  She states she has history of dental abscess.  She has follow-up with the 

dentist next week.  States the pain is constant and unbearable.  She has been 

taking ibuprofen without relief.  She denies any fevers.  No pain or swelling 

around her eyes.  No pain with eye movement.  No chest pain or shortness of 

breath.  On exam has abscess noted to left upper jaw.  Patient declined I&D.  

Will place on clindamycin.  Gave short course of pain medication.  Patient 

understands agrees with plan.





- Differential Diagnoses


Differential Diagnoses: Dental Abscess, Dental Caries, Fractured Tooth





- Diagnoses


Provider Diagnoses: 


 Dental abscess








Discharge





- Sign-Out/Discharge


Documenting (check all that apply): Patient Departure


Patient Received Moderate/Deep Sedation with Procedure: No





- Discharge Plan


Condition: Good


Disposition: HOME


Prescriptions: 


Clindamycin Cap(NF) [Clindamycin Cap 300 mg Cap(NF)] 300 mg PO TID #20 cap


HYDROcodone/ACETAMIN 5-325 MG* [Norco 5-325 TAB*] 1 tab PO Q6H PRN #6 tab MDD 4


 PRN Reason: Pain


Patient Education Materials:  Dental Abscess (ED)


Referrals: 


[facility] [place] PHYSICIAN REFERRAL [Outside]


Additional Instructions: 


Take clindamycin three times a day for 7 days


Take ibuprofen every 6 hours for pain as needed, use norco for break through 

pain every 6 hours


Avoid hard, crunchy food until seen by dentist


Return to ED if develop any new or worsening symptoms


Establish care with primary care physician and dentist as soon as possible 





- Billing Disposition and Condition


Condition: GOOD


Disposition: Home

## 2019-09-01 ENCOUNTER — HOSPITAL ENCOUNTER (EMERGENCY)
Dept: HOSPITAL 25 - ED | Age: 24
Discharge: HOME | End: 2019-09-01
Payer: SELF-PAY

## 2019-09-01 VITALS — DIASTOLIC BLOOD PRESSURE: 63 MMHG | SYSTOLIC BLOOD PRESSURE: 122 MMHG

## 2019-09-01 DIAGNOSIS — K04.7: Primary | ICD-10-CM

## 2019-09-01 DIAGNOSIS — F41.9: ICD-10-CM

## 2019-09-01 DIAGNOSIS — F17.210: ICD-10-CM

## 2019-09-01 DIAGNOSIS — Z87.442: ICD-10-CM

## 2019-09-01 DIAGNOSIS — F32.9: ICD-10-CM

## 2019-09-01 DIAGNOSIS — Z88.0: ICD-10-CM

## 2019-09-01 PROCEDURE — 99282 EMERGENCY DEPT VISIT SF MDM: CPT

## 2019-09-01 NOTE — ED
Throat Pain/Nasal Congestion





- HPI Summary


HPI Summary: 





Pt is a 22 y/o F presenting to the ED with a chief complaint of dental pain on 

the R lower jaw. She states it has worsened this evening, and the area is 

edematous. She denies fever.





- History of Current Complaint


Chief Complaint: EDDentalPain


Time Seen by Provider: 09/01/19 01:51


Hx Obtained From: Patient


Onset/Duration: Gradual Onset, Lasting Days, Still Present


Severity: Moderate





- Allergies/Home Medications


Allergies/Adverse Reactions: 


 Allergies











Allergy/AdvReac Type Severity Reaction Status Date / Time


 


bee venom protein (honey bee) Allergy  Difficulty Verified 09/01/19 00:26





   Breathing/Wheezing  


 


Latex, Natural Rubber Allergy  Hives Verified 09/01/19 00:26


 


Penicillins Allergy  Hives Verified 09/01/19 00:26














PMH/Surg Hx/FS Hx/Imm Hx


Previously Healthy: Yes


Endocrine/Hematology History: 


   Denies: Hx Anticoagulant Therapy


Respiratory History: Reports: Hx Asthma


 History: Reports: Hx Kidney Infection, Hx Kidney Stones


Psychiatric History: Reports: Hx Anxiety, Hx Depression





- Surgical History


Surgery Procedure, Year, and Place: n/a





- Immunization History


Date of Tetanus Vaccine: utd


Date of Influenza Vaccine: none


Infectious Disease History: No


Infectious Disease History: 


   Denies: Traveled Outside the [place] in Last 30 Days





- Family History


Known Family History: Positive: Hypertension





- Social History


Alcohol Use: None


Alcohol Amount: denies


Hx Substance Use: No


Substance Use Type: Reports: None


Substance Use Comment - Amount & Last Used: denies


Hx Tobacco Use: Yes


Smoking Status (MU): Heavy Every Day Tobacco Smoker


Type: Cigarettes


Amount Used/How Often: 1/2ppd


Have You Smoked in the Last Year: Yes





Review of Systems


Negative: Fever


Positive: Dental Pain


All Other Systems Reviewed And Are Negative: Yes





Physical Exam





- Summary


Physical Exam Summary: 





Appearance: Well-appearing, Well-nourished, lying in bed comfortably


Skin: Warm, dry, no obvious rash


Eyes: sclera anicteric, no conjunctival pallor


ENT: mucous membranes moist, pt is pointing to what looks like a dental abscess 

on the R  lower 1st molar.


Neck: Supple, nontender


Respiratory: Clear to auscultation, no signs of respiratory distress


Cardiovascular: Normal S1, S2. No murmurs. Normal distal pulses in tibial and 

radial bilaterally.


Abdomen: Soft, nontender, normal active bowel sounds present


Musculoskeletal: Normal, Strength/ROM Intact


Neurological: A&Ox3, awake and alert, mentation is normal, speech is fluent and 

appropriate


Psychiatric: affect is normal, does not appear anxious or depressed





Triage Information Reviewed: Yes


Vital Signs On Initial Exam: 


 Initial Vitals











Temp Pulse Resp BP Pulse Ox


 


 97.0 F   112   20   141/76   98 


 


 09/01/19 00:20  09/01/19 00:20  09/01/19 00:20  09/01/19 00:20  09/01/19 00:20











Vital Signs Reviewed: Yes





Diagnostics





- Vital Signs


 Vital Signs











  Temp Pulse Resp BP Pulse Ox


 


 09/01/19 00:20  97.0 F  112  20  141/76  98














- Laboratory


Lab Statement: Any lab studies that have been ordered have been reviewed, and 

results considered in the medical decision making process.





EENT Course/Dx





- Course


Course Of Treatment: Pt is a 22 y/o F presenting to the ED with a chief 

complaint of dental pain on the R lower jaw. She states it has worsened this 

evening, and the area is edematous. She denies fever.  On exam, the pt is 

pointing to what looks like a dental abscess on the R lower 1st molar. I 

drained the abscess with a stab incision using an 11 blade,with good purulent 

drainag, with relief to the patient.  She will be d/c'ed with dx of dental 

abscess. She is stable and agreeable with this plan.





- Diagnoses


Provider Diagnoses: 


 Dental abscess








Discharge ED





- Sign-Out/Discharge


Documenting (check all that apply): Patient Departure


Patient Received Moderate/Deep Sedation with Procedure: No





- Discharge Plan


Condition: Stable


Disposition: HOME


Prescriptions: 


Clindamycin HCl 300 mg PO TID #30 capsule


Patient Education Materials:  Dental Abscess (ED)


Referrals: 


[facility] [Outside]


Additional Instructions: 


Between lancing the abscess and the antibiotics this should be much improved 

over the next couple of days, but make sure to see your dentist to have the 

problem addressed definitively.





- Billing Disposition and Condition


Condition: STABLE


Disposition: Home





- Attestation Statements


Document Initiated by Scribe: Yes


Documenting Scribe: Thalia Patel


Provider For Whom Latrellibe is Documenting (Include Credential): Liban Bloom MD.


Scribe Attestation: 


Thalia ANGELO, scribed for Liban Bloom MD. on 09/01/19 at 2158. 


Scribe Documentation Reviewed: Yes


Provider Attestation: 


The documentation as recorded by the scribe, Thalia Patel accurately 

reflects the service I personally performed and the decisions made by me, 

Liban Bloom MD.


Status of Scribe Document: Viewed